# Patient Record
Sex: MALE
[De-identification: names, ages, dates, MRNs, and addresses within clinical notes are randomized per-mention and may not be internally consistent; named-entity substitution may affect disease eponyms.]

---

## 2017-01-20 ENCOUNTER — HOSPITAL (OUTPATIENT)
Dept: OTHER | Age: 67
End: 2017-01-20
Attending: ORTHOPAEDIC SURGERY

## 2017-01-25 ENCOUNTER — HOSPITAL (OUTPATIENT)
Dept: OTHER | Age: 67
End: 2017-01-25
Attending: ORTHOPAEDIC SURGERY

## 2017-01-26 ENCOUNTER — CHARTING TRANS (OUTPATIENT)
Dept: OTHER | Age: 67
End: 2017-01-26

## 2019-12-04 PROBLEM — M17.11 PRIMARY OSTEOARTHRITIS OF RIGHT KNEE: Status: ACTIVE | Noted: 2019-12-04

## 2021-07-09 ENCOUNTER — HOSPITAL ENCOUNTER (EMERGENCY)
Age: 71
Discharge: HOME | End: 2021-07-09
Payer: MEDICARE

## 2021-07-09 VITALS
HEIGHT: 73 IN | WEIGHT: 250 LBS | BODY MASS INDEX: 33.13 KG/M2 | RESPIRATION RATE: 18 BRPM | TEMPERATURE: 98 F | SYSTOLIC BLOOD PRESSURE: 160 MMHG | DIASTOLIC BLOOD PRESSURE: 91 MMHG | HEART RATE: 73 BPM | OXYGEN SATURATION: 99 %

## 2021-07-09 DIAGNOSIS — S81.811A LACERATION OF LOWER EXTREMITY, RIGHT, INITIAL ENCOUNTER: Primary | ICD-10-CM

## 2021-07-09 PROCEDURE — 12002 RPR S/N/AX/GEN/TRNK2.6-7.5CM: CPT

## 2021-07-09 PROCEDURE — 99283 EMERGENCY DEPT VISIT LOW MDM: CPT

## 2021-07-09 PROCEDURE — 90471 IMMUNIZATION ADMIN: CPT

## 2021-07-09 RX ORDER — CEPHALEXIN 500 MG/1
500 CAPSULE ORAL 4 TIMES DAILY
Qty: 40 CAPSULE | Refills: 0 | Status: SHIPPED | OUTPATIENT
Start: 2021-07-09 | End: 2021-07-19

## 2021-07-09 NOTE — ED PROVIDER NOTES
Patient Seen in: [facility] In [facility]      History   Patient presents with:  Laceration/Abrasion    Stated Complaint: Right calf laceration- cut with peice of trim    HPI/Subjective:   HPI    72-year-old male here with complaint of a 113.4 kg   SpO2 99%   BMI 32.98 kg/m²         Physical Exam  Vitals and nursing note reviewed. Constitutional:       Appearance: He is well-developed. HENT:      Head: Normocephalic.       Right Ear: External ear normal.      Left Ear: External ear norm swelling, streaking, drainage or fevers. Take the full course of antibiotics as prescribed in tandem with a probiotic daily. Suture removal in 10 to 14 days.   You may have the sutures removed at your primary care physician's office or one of our immediat

## 2021-07-20 PROBLEM — Z80.42 FAMILY HISTORY OF PROSTATE CANCER: Status: ACTIVE | Noted: 2021-01-28

## 2021-07-20 PROBLEM — R07.9 CHEST PAIN: Status: ACTIVE | Noted: 2018-03-19

## 2021-07-20 PROBLEM — K21.9 GERD (GASTROESOPHAGEAL REFLUX DISEASE): Status: ACTIVE | Noted: 2018-03-19

## 2021-07-20 PROBLEM — E78.5 HYPERLIPIDEMIA: Status: ACTIVE | Noted: 2018-03-19

## 2021-07-20 PROBLEM — K56.609 SMALL BOWEL OBSTRUCTION (HCC): Status: ACTIVE | Noted: 2017-07-31

## 2021-07-20 PROBLEM — I10 HYPERTENSION: Status: ACTIVE | Noted: 2018-03-19

## 2021-07-20 PROBLEM — I44.0 1ST DEGREE AV BLOCK: Status: ACTIVE | Noted: 2018-03-19

## 2021-10-13 ENCOUNTER — APPOINTMENT (OUTPATIENT)
Dept: ULTRASOUND IMAGING | Age: 71
End: 2021-10-13
Attending: EMERGENCY MEDICINE
Payer: MEDICARE

## 2021-10-13 ENCOUNTER — APPOINTMENT (OUTPATIENT)
Dept: GENERAL RADIOLOGY | Age: 71
End: 2021-10-13
Attending: EMERGENCY MEDICINE
Payer: MEDICARE

## 2021-10-13 ENCOUNTER — HOSPITAL ENCOUNTER (EMERGENCY)
Age: 71
Discharge: HOME | End: 2021-10-13
Attending: EMERGENCY MEDICINE
Payer: MEDICARE

## 2021-10-13 VITALS
BODY MASS INDEX: 33.13 KG/M2 | SYSTOLIC BLOOD PRESSURE: 139 MMHG | RESPIRATION RATE: 16 BRPM | TEMPERATURE: 97 F | HEART RATE: 66 BPM | HEIGHT: 73 IN | OXYGEN SATURATION: 99 % | WEIGHT: 250 LBS | DIASTOLIC BLOOD PRESSURE: 85 MMHG

## 2021-10-13 DIAGNOSIS — L03.116 CELLULITIS OF LEFT LOWER EXTREMITY: Primary | ICD-10-CM

## 2021-10-13 PROCEDURE — 85025 COMPLETE CBC W/AUTO DIFF WBC: CPT | Performed by: EMERGENCY MEDICINE

## 2021-10-13 PROCEDURE — 73590 X-RAY EXAM OF LOWER LEG: CPT | Performed by: EMERGENCY MEDICINE

## 2021-10-13 PROCEDURE — 36415 COLL VENOUS BLD VENIPUNCTURE: CPT

## 2021-10-13 PROCEDURE — 93971 EXTREMITY STUDY: CPT | Performed by: EMERGENCY MEDICINE

## 2021-10-13 PROCEDURE — 80053 COMPREHEN METABOLIC PANEL: CPT | Performed by: EMERGENCY MEDICINE

## 2021-10-13 PROCEDURE — 99284 EMERGENCY DEPT VISIT MOD MDM: CPT

## 2021-10-13 RX ORDER — CLINDAMYCIN HYDROCHLORIDE 300 MG/1
300 CAPSULE ORAL 3 TIMES DAILY
Qty: 30 CAPSULE | Refills: 0 | Status: SHIPPED | OUTPATIENT
Start: 2021-10-13 | End: 2021-10-23

## 2021-10-13 NOTE — ED PROVIDER NOTES
Patient Seen in: [facility] In [place]      History   Patient presents with:  Cellulitis    Stated Complaint: left leg redness     Subjective:   HPI    Patient is a 68-year-old male does not take any blood thinners presents emergency ED Triage Vitals [10/13/21 1030]   /89   Pulse 97   Resp 18   Temp 96.7 °F (35.9 °C)   Temp src Temporal   SpO2 98 %   O2 Device None (Room air)       Current:/85   Pulse 66   Temp 96.7 °F (35.9 °C) (Temporal)   Resp 16   Ht 185.4 cm (6' 1\ Labs Reviewed   COMP METABOLIC PANEL (14) - Abnormal; Notable for the following components:       Result Value    Glucose 105 (*)     Sodium 135 (*)     A/G Ratio 0.9 (*)     All other components within normal limits   CBC WITH DIFFERENTIAL WITH PLATEL symptoms worsen or if any other problems arise. Patient was started empirically on antibiotics for what appears to be cellulitis patient discharged home at this time.   Patient will be asked to rest and elevate his leg at home return to the ER immediately

## 2022-01-21 PROBLEM — K56.609 SMALL BOWEL OBSTRUCTION (HCC): Status: RESOLVED | Noted: 2017-07-31 | Resolved: 2022-01-21

## 2022-01-21 PROBLEM — R07.9 CHEST PAIN: Status: RESOLVED | Noted: 2018-03-19 | Resolved: 2022-01-21

## 2023-01-21 ENCOUNTER — OFFICE VISIT (OUTPATIENT)
Dept: FAMILY MEDICINE CLINIC | Facility: CLINIC | Age: 73
End: 2023-01-21
Payer: COMMERCIAL

## 2023-01-21 VITALS — OXYGEN SATURATION: 97 % | TEMPERATURE: 100 F | HEART RATE: 92 BPM

## 2023-01-21 DIAGNOSIS — R68.89 FLU-LIKE SYMPTOMS: Primary | ICD-10-CM

## 2023-01-21 PROCEDURE — 87637 SARSCOV2&INF A&B&RSV AMP PRB: CPT | Performed by: FAMILY MEDICINE

## 2023-01-21 PROCEDURE — 99202 OFFICE O/P NEW SF 15 MIN: CPT | Performed by: FAMILY MEDICINE

## 2023-01-21 RX ORDER — OMEPRAZOLE 40 MG/1
40 CAPSULE, DELAYED RELEASE ORAL AS DIRECTED
COMMUNITY

## 2023-01-21 NOTE — PATIENT INSTRUCTIONS
Your testing will be back in 24-36 hours. Use OTC meds for comfort as needed--  Tylenol for fever/pain  Use Benadryl at bedtime to reduce drainage and promote rest.  Zyrtec/Claritin/Allegra in the AM to reduce nasal drainage without sedation. Use saline nasal sprays to reduce congestion and thin secretions. Use Delsym for cough. Consider applying kush's vapo-rub or eucayptus oil to chest and feet at bedtime to reduce chest and nasal congestion. Warm tea with honey, cough lozenges, vaporizers/steam etc.    To help reduce fever: Add 1 cup of each of the following to bath water that is hot as you can tolerate comfortably. 1 c. Apple cider vinegar  1 c epsom salts  1 c baking soda  1 c sea salt    Soak for 20 minutes to help break fever and reduce body aches. If no better in 2-3 days, follow-up with your PCP for further evaluation.

## 2023-01-22 LAB
FLUAV + FLUBV RNA SPEC NAA+PROBE: NOT DETECTED
FLUAV + FLUBV RNA SPEC NAA+PROBE: NOT DETECTED
RSV RNA SPEC NAA+PROBE: NOT DETECTED
SARS-COV-2 RNA RESP QL NAA+PROBE: DETECTED

## 2023-10-02 ENCOUNTER — HOSPITAL ENCOUNTER (EMERGENCY)
Age: 73
Discharge: LEFT BEFORE DISCHARGE | End: 2023-10-02

## 2023-10-02 ENCOUNTER — APPOINTMENT (OUTPATIENT)
Dept: GENERAL RADIOLOGY | Age: 73
End: 2023-10-02

## 2023-10-02 VITALS
HEART RATE: 92 BPM | SYSTOLIC BLOOD PRESSURE: 103 MMHG | OXYGEN SATURATION: 95 % | BODY MASS INDEX: 33.13 KG/M2 | HEIGHT: 73 IN | RESPIRATION RATE: 16 BRPM | TEMPERATURE: 99 F | WEIGHT: 250 LBS | DIASTOLIC BLOOD PRESSURE: 70 MMHG

## 2023-10-02 PROCEDURE — 73060 X-RAY EXAM OF HUMERUS: CPT

## 2024-08-13 ENCOUNTER — APPOINTMENT (OUTPATIENT)
Dept: GENERAL RADIOLOGY | Age: 74
End: 2024-08-13
Attending: EMERGENCY MEDICINE
Payer: MEDICARE

## 2024-08-13 ENCOUNTER — HOSPITAL ENCOUNTER (EMERGENCY)
Age: 74
Discharge: HOME | End: 2024-08-13
Attending: EMERGENCY MEDICINE
Payer: MEDICARE

## 2024-08-13 ENCOUNTER — APPOINTMENT (OUTPATIENT)
Dept: MRI IMAGING | Age: 74
End: 2024-08-13
Attending: EMERGENCY MEDICINE
Payer: MEDICARE

## 2024-08-13 VITALS
HEART RATE: 69 BPM | RESPIRATION RATE: 16 BRPM | OXYGEN SATURATION: 99 % | WEIGHT: 255 LBS | TEMPERATURE: 98 F | HEIGHT: 73 IN | DIASTOLIC BLOOD PRESSURE: 78 MMHG | BODY MASS INDEX: 33.8 KG/M2 | SYSTOLIC BLOOD PRESSURE: 118 MMHG

## 2024-08-13 DIAGNOSIS — R42 VERTIGO: Primary | ICD-10-CM

## 2024-08-13 LAB
ALBUMIN SERPL-MCNC: 3.8 G/DL (ref 3.4–5)
ALBUMIN/GLOB SERPL: 1.1 {RATIO} (ref 1–2)
ALP LIVER SERPL-CCNC: 105 U/L
ALT SERPL-CCNC: 39 U/L
ANION GAP SERPL CALC-SCNC: 4 MMOL/L (ref 0–18)
AST SERPL-CCNC: 21 U/L (ref 15–37)
ATRIAL RATE: 76 BPM
BASOPHILS # BLD AUTO: 0.07 X10(3) UL (ref 0–0.2)
BASOPHILS NFR BLD AUTO: 1.1 %
BILIRUB SERPL-MCNC: 0.7 MG/DL (ref 0.1–2)
BILIRUB UR QL STRIP.AUTO: NEGATIVE
BUN BLD-MCNC: 23 MG/DL (ref 9–23)
CALCIUM BLD-MCNC: 9.6 MG/DL (ref 8.5–10.1)
CHLORIDE SERPL-SCNC: 105 MMOL/L (ref 98–112)
CLARITY UR REFRACT.AUTO: CLEAR
CO2 SERPL-SCNC: 27 MMOL/L (ref 21–32)
COLOR UR AUTO: YELLOW
CREAT BLD-MCNC: 0.97 MG/DL
EGFRCR SERPLBLD CKD-EPI 2021: 82 ML/MIN/1.73M2 (ref 60–?)
EOSINOPHIL # BLD AUTO: 0.11 X10(3) UL (ref 0–0.7)
EOSINOPHIL NFR BLD AUTO: 1.8 %
ERYTHROCYTE [DISTWIDTH] IN BLOOD BY AUTOMATED COUNT: 12.9 %
GLOBULIN PLAS-MCNC: 3.4 G/DL (ref 2.8–4.4)
GLUCOSE BLD-MCNC: 108 MG/DL (ref 70–99)
GLUCOSE UR STRIP.AUTO-MCNC: NEGATIVE MG/DL
HCT VFR BLD AUTO: 41.1 %
HGB BLD-MCNC: 14 G/DL
IMM GRANULOCYTES # BLD AUTO: 0.03 X10(3) UL (ref 0–1)
IMM GRANULOCYTES NFR BLD: 0.5 %
KETONES UR STRIP.AUTO-MCNC: NEGATIVE MG/DL
LEUKOCYTE ESTERASE UR QL STRIP.AUTO: NEGATIVE
LYMPHOCYTES # BLD AUTO: 0.91 X10(3) UL (ref 1–4)
LYMPHOCYTES NFR BLD AUTO: 14.6 %
MCH RBC QN AUTO: 30.4 PG (ref 26–34)
MCHC RBC AUTO-ENTMCNC: 34.1 G/DL (ref 31–37)
MCV RBC AUTO: 89.2 FL
MONOCYTES # BLD AUTO: 0.44 X10(3) UL (ref 0.1–1)
MONOCYTES NFR BLD AUTO: 7 %
NEUTROPHILS # BLD AUTO: 4.69 X10 (3) UL (ref 1.5–7.7)
NEUTROPHILS # BLD AUTO: 4.69 X10(3) UL (ref 1.5–7.7)
NEUTROPHILS NFR BLD AUTO: 75 %
NITRITE UR QL STRIP.AUTO: NEGATIVE
OSMOLALITY SERPL CALC.SUM OF ELEC: 286 MOSM/KG (ref 275–295)
P AXIS: 4 DEGREES
P-R INTERVAL: 264 MS
PH UR STRIP.AUTO: 5.5 [PH] (ref 5–8)
PLATELET # BLD AUTO: 148 10(3)UL (ref 150–450)
POTASSIUM SERPL-SCNC: 4.1 MMOL/L (ref 3.5–5.1)
PROT SERPL-MCNC: 7.2 G/DL (ref 6.4–8.2)
PROT UR STRIP.AUTO-MCNC: NEGATIVE MG/DL
Q-T INTERVAL: 394 MS
QRS DURATION: 102 MS
QTC CALCULATION (BEZET): 443 MS
R AXIS: 19 DEGREES
RBC # BLD AUTO: 4.61 X10(6)UL
RBC UR QL AUTO: NEGATIVE
SODIUM SERPL-SCNC: 136 MMOL/L (ref 136–145)
SP GR UR STRIP.AUTO: 1.01 (ref 1–1.03)
T AXIS: 4 DEGREES
UROBILINOGEN UR STRIP.AUTO-MCNC: 0.2 MG/DL
VENTRICULAR RATE: 76 BPM
WBC # BLD AUTO: 6.3 X10(3) UL (ref 4–11)

## 2024-08-13 PROCEDURE — A9575 INJ GADOTERATE MEGLUMI 0.1ML: HCPCS | Performed by: EMERGENCY MEDICINE

## 2024-08-13 PROCEDURE — 70553 MRI BRAIN STEM W/O & W/DYE: CPT | Performed by: EMERGENCY MEDICINE

## 2024-08-13 PROCEDURE — 81003 URINALYSIS AUTO W/O SCOPE: CPT | Performed by: EMERGENCY MEDICINE

## 2024-08-13 PROCEDURE — 96360 HYDRATION IV INFUSION INIT: CPT

## 2024-08-13 PROCEDURE — 80053 COMPREHEN METABOLIC PANEL: CPT | Performed by: EMERGENCY MEDICINE

## 2024-08-13 PROCEDURE — 93005 ELECTROCARDIOGRAM TRACING: CPT

## 2024-08-13 PROCEDURE — 93010 ELECTROCARDIOGRAM REPORT: CPT

## 2024-08-13 PROCEDURE — 99284 EMERGENCY DEPT VISIT MOD MDM: CPT

## 2024-08-13 PROCEDURE — 85025 COMPLETE CBC W/AUTO DIFF WBC: CPT

## 2024-08-13 PROCEDURE — 99285 EMERGENCY DEPT VISIT HI MDM: CPT

## 2024-08-13 PROCEDURE — 80053 COMPREHEN METABOLIC PANEL: CPT

## 2024-08-13 PROCEDURE — 85025 COMPLETE CBC W/AUTO DIFF WBC: CPT | Performed by: EMERGENCY MEDICINE

## 2024-08-13 PROCEDURE — 70030 X-RAY EYE FOR FOREIGN BODY: CPT | Performed by: EMERGENCY MEDICINE

## 2024-08-13 RX ORDER — ASPIRIN 81 MG/1
81 TABLET, CHEWABLE ORAL DAILY
COMMUNITY

## 2024-08-13 RX ORDER — MECLIZINE HYDROCHLORIDE 25 MG/1
25 TABLET ORAL ONCE
Status: COMPLETED | OUTPATIENT
Start: 2024-08-13 | End: 2024-08-13

## 2024-08-13 RX ORDER — MECLIZINE HYDROCHLORIDE 25 MG/1
25 TABLET ORAL 3 TIMES DAILY PRN
Qty: 20 TABLET | Refills: 0 | Status: SHIPPED | OUTPATIENT
Start: 2024-08-13

## 2024-08-13 RX ORDER — GADOTERATE MEGLUMINE 376.9 MG/ML
30 INJECTION INTRAVENOUS
Status: COMPLETED | OUTPATIENT
Start: 2024-08-13 | End: 2024-08-13

## 2024-08-13 NOTE — ED INITIAL ASSESSMENT (HPI)
Pt reports since last week he started to have intermittent dizziness, suspected high blood pressure (per pt never actually checked his BP) and visual changes. Pt reports he was golfing today and could not finish due to these symptoms.

## 2024-08-13 NOTE — ED PROVIDER NOTES
Patient Seen in: [facility] In [place]      History     Chief Complaint   Patient presents with    Hypertension    Dizziness     Stated Complaint: high blood pressure, visual changes off and on for past week    Subjective:   HPI    Mr. Perez reported experiencing episodes of visual changes and dizziness. These episodes began last week, with the patient noting that he would feel dizzy upon standing up from a seated position. He mentioned that he had experienced similar episodes in the past, but not with the same frequency or intensity. The patient is currently undergoing treatment for prostate cancer and had initially thought that these symptoms might be related to his treatment. However, after consulting with his cancer nurse, he was informed that these symptoms were not related to his cancer treatment. He did mention that he experiences tiredness and sweating, which they attribute to his prostate cancer treatment. On the day of the consultation, the patient reported feeling unwell while golfing, a regular activity he engages in every Tuesday. He described feeling off-balance and having difficulty following the movement of the golf ball due to visual disturbances. He did not fall or lose consciousness but had to regain his balance several times. He also reported that his vision would blur and then return to normal. The patient denied experiencing any headaches but mentioned a long-standing issue with his neck in terms of pain. However, he denied any shooting pain down his arms or numbness on either side of his body. He recently had a retina eye exam, which did not reveal any issues apart from floaters.    Objective:   Past Medical History:    Essential hypertension    GERD (gastroesophageal reflux disease)    High blood pressure    High cholesterol    Hyperlipidemia    Osteoarthritis              Past Surgical History:   Procedure Laterality Date    Foot surgery Left 2000    ankle/heal ORIF     Knee arthroscopy Right 2009 and 1991    x2    Other surgical history  2016    kidney stones                Social History     Socioeconomic History    Marital status:     Number of children: 0   Occupational History    Occupation: Retired--   Tobacco Use    Smoking status: Some Days     Types: Cigars    Smokeless tobacco: Never   Vaping Use    Vaping status: Never Used   Substance and Sexual Activity    Alcohol use: Yes     Comment: social beer    Drug use: Never    Sexual activity: Yes     Partners: Female   Other Topics Concern     Service Yes     Comment: [employer] x 2 years    Blood Transfusions No    Sleep Concern No    Seat Belt Yes   Social History Narrative         Two adopted children--adults     Social Determinants of Health      Received from [facility]              Review of Systems    Positive for stated Chief Complaint: Hypertension and Dizziness    Other systems are as noted in HPI.  Constitutional and vital signs reviewed.      All other systems reviewed and negative except as noted above.    Physical Exam     ED Triage Vitals [08/13/24 1116]   /80   Pulse 85   Resp 16   Temp 98.3 °F (36.8 °C)   Temp src    SpO2 98 %   O2 Device None (Room air)       Current Vitals:   Vital Signs  BP: 124/90  Pulse: 75  Resp: 16  Temp: 98.3 °F (36.8 °C)    Oxygen Therapy  SpO2: 97 %  O2 Device: None (Room air)            Physical Exam    General: Alert and oriented x3 in no acute distress.  HEENT: Normocephalic, atraumatic, pupils equal round and reactive to light.  Neck: Supple.  Cardiovascular: Regular rate and rhythm, no murmurs.  Respiratory: Lungs clear to auscultation.  Extremities: No CCE.  Skin: Warm and dry.  Neurologic: Cranial nerves intact.  Strength 5/5 in all extremities.  Sensory exam grossly intact.    ED Course     Labs Reviewed   CBC WITH DIFFERENTIAL WITH PLATELET - Abnormal; Notable for the following components:       Result Value    PLT  148.0 (*)     Lymphocyte Absolute 0.91 (*)     All other components within normal limits   COMP METABOLIC PANEL (14) - Abnormal; Notable for the following components:    Glucose 108 (*)     All other components within normal limits   URINALYSIS, ROUTINE   RAINBOW DRAW LAVENDER   RAINBOW DRAW LIGHT GREEN   RAINBOW DRAW BLUE     EKG    Rate, intervals and axes as noted on EKG Report.  Rate: 76  Rhythm: Sinus rhythm with first-degree AV block  Reading: No acute ischemic abnormality                 MRI brain: Pending    Medications   sodium chloride 0.9 % IV bolus 1,000 mL (0 mL Intravenous Stopped 8/13/24 1228)   meclizine (Antivert) tab 25 mg (25 mg Oral Given 8/13/24 1248)     Patient was given meclizine for dizziness and started on a normal saline bolus.         MDM      Patient presents with dizziness.  Differential diagnosis includes but is not limited to benign positional vertigo and acute stroke.  The patient had been concerned that his blood pressure may be elevated but it was not on arrival.  His symptoms seem consistent with vertigo.  His neurologic exam is intact.  He has overall reassuring lab work.  He is going for an MRI to rule out a stroke.  If his MRI is negative I think he can safely be discharged home on meclizine with follow-up.                           MDM    Disposition and Plan     Clinical Impression:  1. Vertigo         Disposition:  There is no disposition on file for this visit.  There is no disposition time on file for this visit.    Follow-up:  Percy Mendez MD  [address] [facility] [place]  [address] [place] [id number] [phone]    Follow up            Medications Prescribed:  Current Discharge Medication List        START taking these medications    Details   meclizine 25 MG Oral Tab Take 1 tablet (25 mg total) by mouth 3 (three) times daily as needed.  Qty: 20 tablet, Refills: 0

## 2025-01-26 ENCOUNTER — OFFICE VISIT (OUTPATIENT)
Dept: FAMILY MEDICINE CLINIC | Facility: CLINIC | Age: 75
End: 2025-01-26
Payer: COMMERCIAL

## 2025-01-26 VITALS
SYSTOLIC BLOOD PRESSURE: 114 MMHG | TEMPERATURE: 98 F | HEART RATE: 82 BPM | BODY MASS INDEX: 35 KG/M2 | DIASTOLIC BLOOD PRESSURE: 70 MMHG | RESPIRATION RATE: 16 BRPM | OXYGEN SATURATION: 98 % | WEIGHT: 268.38 LBS

## 2025-01-26 DIAGNOSIS — J06.9 VIRAL URI WITH COUGH: Primary | ICD-10-CM

## 2025-01-26 LAB
OPERATOR ID: NORMAL
RAPID SARS-COV-2 BY PCR: NOT DETECTED

## 2025-01-26 NOTE — PROGRESS NOTES
CHIEF COMPLAINT:     Chief Complaint   Patient presents with    Sinus Problem     Chest congestion, sore throat due to sinus.  On hormone therapy for prostate cancer which I sweat - Entered by patient  S/s for 4 days.  OTC meds taken       HPI:   Negrito Perez is a 74 year old male who presents for sinus congestion, sinus drainage, sore throat, cough, chest congestion. Symptoms started 3 days ago.   Today with increased fatigue and chest congestion. Treating symptoms with sudafed and delsym, last dose yesterday.  No fever or chills- states has had some sweats but states that is typical for him due to his therapy for prostate cancer.  Patient has not tested for COVID since symptom onset.    Current Outpatient Medications   Medication Sig Dispense Refill    aspirin 81 MG Oral Chew Tab Chew 1 tablet (81 mg total) by mouth daily.      Cranberry 250 MG Oral Chew Tab Chew by mouth.      meclizine 25 MG Oral Tab Take 1 tablet (25 mg total) by mouth 3 (three) times daily as needed. 20 tablet 0    Cholecalciferol 50 MCG (2000 UT) Oral Cap Take by mouth As Directed. (Patient not taking: Reported on 8/13/2024)      Omeprazole 40 MG Oral Capsule Delayed Release Take 1 capsule (40 mg total) by mouth As Directed.      losartan 50 MG Oral Tab Take 1 tablet (50 mg total) by mouth daily. 90 tablet 3    triamcinolone 0.1 % External Cream Apply twice a day to itch; do not use on face (Patient not taking: Reported on 8/13/2024) 80 g 11    mupirocin 2 % External Ointment mupirocin 2 % topical ointment  APPLY SMALL AMOUNT EXTERNALLY TO THE AFFECTED AREA THREE TIMES DAILY (Patient not taking: Reported on 8/13/2024) 1 each 0    ATORVASTATIN 20 MG Oral Tab TAKE 1 TABLET(20 MG) BY MOUTH DAILY 90 tablet 0    Meloxicam 15 MG Oral Tab Take 1 tablet (15 mg total) by mouth daily. 90 tablet 3      Past Medical History:    Essential hypertension    GERD (gastroesophageal reflux disease)    High blood pressure    High cholesterol    Hyperlipidemia     Osteoarthritis      Past Surgical History:   Procedure Laterality Date    Foot surgery Left 2000    ankle/heal ORIF    Knee arthroscopy Right 2009 and 1991    x2    Other surgical history  2016    kidney stones         Social History     Socioeconomic History    Marital status:     Number of children: 0   Occupational History    Occupation: Retired--   Tobacco Use    Smoking status: Some Days     Types: Cigars    Smokeless tobacco: Never   Vaping Use    Vaping status: Never Used   Substance and Sexual Activity    Alcohol use: Yes     Comment: social beer    Drug use: Never    Sexual activity: Yes     Partners: Female   Other Topics Concern     Service Yes     Comment: [employer] x 2 years    Blood Transfusions No    Sleep Concern No    Seat Belt Yes   Social History Narrative         Two adopted children--adults     Social Drivers of Health      Received from [facility]         REVIEW OF SYSTEMS:   GENERAL: no fever. + fatigue.   SKIN: no rashes or abnormal skin lesions  HEENT: See HPI  LUNGS: denies shortness of breath or wheezing, See HPI  CARDIOVASCULAR: denies chest pain or palpitations   GI: denies N/V/C or abdominal pain  NEURO: Denies dizziness or weakness    EXAM:   /70   Pulse 82   Temp 98.3 °F (36.8 °C) (Oral)   Resp 16   Wt 268 lb 6.4 oz (121.7 kg)   SpO2 98%   BMI 35.41 kg/m²   GENERAL: well developed, well nourished,in no apparent distress  SKIN: no rashes,no suspicious lesions  HEAD: atraumatic, normocephalic.    EYES: conjunctiva clear, EOM intact  EARS: TM's gray, no bulging, no retraction,no fluid, bony landmarks visible  NOSE: Nostrils patent, clear nasal discharge, nasal mucosa erythematous   THROAT: Oral mucosa pink, moist. Posterior pharynx is non erythematous. no exudates. Tonsils 1/4.    NECK: Supple, non-tender  LUNGS: clear to auscultation bilaterally, no wheezes or rhonchi. Breathing is non labored.  CARDIO: RRR without  murmur  EXTREMITIES: no cyanosis, clubbing or edema  LYMPH:  no lymphadenopathy.        ASSESSMENT AND PLAN:   Negrito Perez is a 74 year old male who presents with upper respiratory symptoms that are consistent with    ASSESSMENT:   Encounter Diagnosis   Name Primary?    Viral URI with cough Yes         PLAN:   Rapid COVID negative  Lungs clear on exam however due to reported increased chest congestion and worsening symptoms today, offered cxr to r/o developing pneumonia. Pt declines.   Comfort care per pt instructions.   To follow up for any new or worsening symptoms or if not improving the next few days.   To go to the ER for any severe symptoms such as difficulty breathing or chest pain.     Meds & Refills for this Visit:  Requested Prescriptions      No prescriptions requested or ordered in this encounter       Risks, benefits, and side effects of medication explained and discussed.    Patient Instructions   Push fluids and rest  Expectorant for chest congestion  Humidifier  Follow up with your primary care doctor if not improving over the next 3-4 days  Seek urgent follow up for any new/ worsening symptoms    The patient indicates understanding of these issues and agrees to the plan.  The patient is asked to return if sx's persist or worsen.

## 2025-01-26 NOTE — PATIENT INSTRUCTIONS
Push fluids and rest  Expectorant for chest congestion  Humidifier  Follow up with your primary care doctor if not improving over the next 3-4 days  Seek urgent follow up for any new/ worsening symptoms

## 2025-03-14 ENCOUNTER — OFFICE VISIT (OUTPATIENT)
Dept: FAMILY MEDICINE CLINIC | Facility: CLINIC | Age: 75
End: 2025-03-14
Payer: COMMERCIAL

## 2025-03-14 VITALS
BODY MASS INDEX: 36 KG/M2 | RESPIRATION RATE: 16 BRPM | WEIGHT: 274 LBS | HEART RATE: 74 BPM | SYSTOLIC BLOOD PRESSURE: 134 MMHG | OXYGEN SATURATION: 95 % | TEMPERATURE: 99 F | DIASTOLIC BLOOD PRESSURE: 80 MMHG

## 2025-03-14 DIAGNOSIS — J11.1 INFLUENZA-LIKE ILLNESS: Primary | ICD-10-CM

## 2025-03-14 PROCEDURE — 87637 SARSCOV2&INF A&B&RSV AMP PRB: CPT | Performed by: NURSE PRACTITIONER

## 2025-03-14 PROCEDURE — 99213 OFFICE O/P EST LOW 20 MIN: CPT | Performed by: NURSE PRACTITIONER

## 2025-03-14 NOTE — PROGRESS NOTES
Chief Complaint   Patient presents with    Flu     Entered by patient  Chills, body aches, more tired since bedtime.  OTC meds taken.     :    HPI:   Negrito Perez is a 75 year old male who presents for chills, body aches, and fatigue. Started suddenly last night.  Denies fever, new congestion/cough, no sore throat. Tolerating po.  Denies rash, N/V/D.     Treatment tried: OTC      Current Outpatient Medications   Medication Sig Dispense Refill    aspirin 81 MG Oral Chew Tab Chew 1 tablet (81 mg total) by mouth daily.      Cranberry 250 MG Oral Chew Tab Chew by mouth.      meclizine 25 MG Oral Tab Take 1 tablet (25 mg total) by mouth 3 (three) times daily as needed. 20 tablet 0    Cholecalciferol 50 MCG (2000 UT) Oral Cap Take by mouth As Directed. (Patient not taking: Reported on 8/13/2024)      Omeprazole 40 MG Oral Capsule Delayed Release Take 1 capsule (40 mg total) by mouth As Directed.      losartan 50 MG Oral Tab Take 1 tablet (50 mg total) by mouth daily. 90 tablet 3    triamcinolone 0.1 % External Cream Apply twice a day to itch; do not use on face (Patient not taking: Reported on 8/13/2024) 80 g 11    mupirocin 2 % External Ointment mupirocin 2 % topical ointment  APPLY SMALL AMOUNT EXTERNALLY TO THE AFFECTED AREA THREE TIMES DAILY (Patient not taking: Reported on 8/13/2024) 1 each 0    ATORVASTATIN 20 MG Oral Tab TAKE 1 TABLET(20 MG) BY MOUTH DAILY 90 tablet 0    Meloxicam 15 MG Oral Tab Take 1 tablet (15 mg total) by mouth daily. 90 tablet 3      Past Medical History:    Essential hypertension    GERD (gastroesophageal reflux disease)    High blood pressure    High cholesterol    Hyperlipidemia    Osteoarthritis      Past Surgical History:   Procedure Laterality Date    Foot surgery Left 2000    ankle/heal ORIF    Knee arthroscopy Right 2009 and 1991    x2    Other surgical history  2016    kidney stones      Family History   Problem Relation Age of Onset    Heart Disease Father         heart attack     Prostate Cancer Brother     Prostate Cancer Brother       Social History     Socioeconomic History    Marital status:     Number of children: 0   Occupational History    Occupation: Retired--   Tobacco Use    Smoking status: Some Days     Types: Cigars    Smokeless tobacco: Never   Vaping Use    Vaping status: Never Used   Substance and Sexual Activity    Alcohol use: Yes     Comment: social beer    Drug use: Never    Sexual activity: Yes     Partners: Female   Other Topics Concern     Service Yes     Comment: [employer] x 2 years    Blood Transfusions No    Sleep Concern No    Seat Belt Yes   Social History Narrative         Two adopted children--adults     Social Drivers of Health      Received from [facility]         REVIEW OF SYSTEMS:   GENERAL: see HPI  SKIN: no rashes  EYES:denies blurred vision or double vision  HEENT: congested; see HPI  CHEST: no chest pains, palpitations.  LUNGS: denies shortness of breath or wheezing.  CARDIOVASCULAR: denies chest pain.  GI: no abdominal pain; see HPI  URO: no decreased urination.      EXAM:   /80   Pulse 74   Temp 98.8 °F (37.1 °C) (Oral)   Resp 16   Wt 274 lb (124.3 kg)   SpO2 95%   BMI 36.15 kg/m²   GENERAL: well developed, well nourished, in no apparent distress, acutely sick.  SKIN: no rashes,no suspicious lesions, flushed.  Warm to touch.  HEAD: atraumatic, normocephalic,  no tenderness on palpation of sinuses  EYES: conjunctiva clear  EARS: TM's clear gray, no bulging, no retraction, no fluid, bony landmarks visible  NOSE: nostrils patent, clear nasal mucous, nasal mucosa reddened and swollen  THROAT: oral mucosa pink, moist. Posterior pharynx is not erythematous. no exudates.  NECK: supple, non-tender  LUNGS: clear to auscultation bilaterally, no wheezes or rhonchi. Breathing is non labored.  Dry cough.  CARDIO: RRR without murmur  GI: good BS's,no masses, HSM or tenderness  EXTREMITIES: no cyanosis,  clubbing or edema  LYMPH:  no cervical lymphadenopathy.        ASSESSMENT AND PLAN:   Negrito Perez is a 75 year old male who presents with flu-like symptoms    ASSESSMENT:  Encounter Diagnosis   Name Primary?    Influenza-like illness Yes       PLAN:  Quad panel sent.  Natural course of influenza, possible complications, CDC recommendations, and treatment options discussed.  Patient has elected to start an antiviral if +  after discussion of risks and benefits. Explained may lessen severity and duration of symptoms, but will not completely remove symptoms.    Rest, increase fluids,ibuprofen/tylenol q 6 hours for fever/aches prn.   Discussed OTC options for symptom relief.    Complications of influenza discussed including secondary infections such as AOM, bronchitis, PNA, sinusitis.  To be rechecked if exhibiting any symptoms of these illnesses.   To f/u with PCP in 3-4 days if sx's persist. Seek immediate medical attention for acute or worsening symptoms.   Verbalizes understanding of these issues and agrees to the plan.    Meds & Refills for this Visit:  Requested Prescriptions      No prescriptions requested or ordered in this encounter         Patient Instructions   I recommend the 4 H's for inflammation:    1. Heat (warm mist from the shower or warm liquids such as tea)  2. Honey (mixed in your tea or by the spoonful [if you are not diabetic; over the age of 1 year]--take a spoonful 3 times a day and don't eat or drink anything for 15-20 minutes)  3. Humidity--cool mist in the bedroom at night  4. Hydration --at least 8 -10 glasses a day

## 2025-03-15 LAB
FLUAV + FLUBV RNA SPEC NAA+PROBE: NOT DETECTED
FLUAV + FLUBV RNA SPEC NAA+PROBE: NOT DETECTED
RSV RNA SPEC NAA+PROBE: NOT DETECTED
SARS-COV-2 RNA RESP QL NAA+PROBE: NOT DETECTED

## 2025-06-03 ENCOUNTER — HOSPITAL ENCOUNTER (EMERGENCY)
Age: 75
Discharge: HOME | End: 2025-06-03
Attending: EMERGENCY MEDICINE
Payer: MEDICARE

## 2025-06-03 VITALS
SYSTOLIC BLOOD PRESSURE: 127 MMHG | BODY MASS INDEX: 35.89 KG/M2 | HEIGHT: 72 IN | TEMPERATURE: 98 F | RESPIRATION RATE: 18 BRPM | WEIGHT: 265 LBS | HEART RATE: 61 BPM | OXYGEN SATURATION: 97 % | DIASTOLIC BLOOD PRESSURE: 82 MMHG

## 2025-06-03 DIAGNOSIS — M54.16 LUMBAR RADICULOPATHY, ACUTE: Primary | ICD-10-CM

## 2025-06-03 PROCEDURE — 99283 EMERGENCY DEPT VISIT LOW MDM: CPT

## 2025-06-03 PROCEDURE — 96372 THER/PROPH/DIAG INJ SC/IM: CPT

## 2025-06-03 PROCEDURE — 99284 EMERGENCY DEPT VISIT MOD MDM: CPT

## 2025-06-03 RX ORDER — ORPHENADRINE CITRATE 100 MG/1
100 TABLET ORAL 2 TIMES DAILY
Qty: 20 EACH | Refills: 0 | Status: SHIPPED | OUTPATIENT
Start: 2025-06-03 | End: 2025-06-13

## 2025-06-03 RX ORDER — CYCLOBENZAPRINE HCL 10 MG
10 TABLET ORAL ONCE
Status: COMPLETED | OUTPATIENT
Start: 2025-06-03 | End: 2025-06-03

## 2025-06-03 RX ORDER — METHYLPREDNISOLONE 4 MG/1
TABLET ORAL
Qty: 21 TABLET | Refills: 0 | Status: SHIPPED | OUTPATIENT
Start: 2025-06-03

## 2025-06-03 RX ORDER — KETOROLAC TROMETHAMINE 30 MG/ML
60 INJECTION, SOLUTION INTRAMUSCULAR; INTRAVENOUS ONCE
Status: COMPLETED | OUTPATIENT
Start: 2025-06-03 | End: 2025-06-03

## 2025-06-03 NOTE — ED PROVIDER NOTES
Patient Seen in: [facility] In [place]        History  Chief Complaint   Patient presents with    Back Pain     Stated Complaint: lower back pain, left leg radiation while golfing    Subjective:   74 yo male presents to the emergency department with c/o low back pain.  Patient states over the last few months he has been having some low back pain.  He takes meloxicam and Tylenol as needed for this.  Then on Saturday while golfing he went to swing his club, and felt a sharp shooting pain to his left lower back and leg.  The pain took him down to his knees.  He states that since then he has been taking the meloxicam and Tylenol for the pain without any improvement.  He has not taken his meloxicam in 2 days.  He denies any numbness, tingling, saddle paresthesias, loss of bowel or bladder control.      The history is provided by the patient.                   Objective:     Past Medical History:    Cancer (HCC)    Essential hypertension    GERD (gastroesophageal reflux disease)    High blood pressure    High cholesterol    Hyperlipidemia    Osteoarthritis              Past Surgical History:   Procedure Laterality Date    Foot surgery Left 2000    ankle/heal ORIF    Knee arthroscopy Right 2009 and 1991    x2    Other surgical history  2016    kidney stones                Social History     Socioeconomic History    Marital status:     Number of children: 0   Occupational History    Occupation: Retired--   Tobacco Use    Smoking status: Some Days     Types: Cigars    Smokeless tobacco: Never   Vaping Use    Vaping status: Never Used   Substance and Sexual Activity    Alcohol use: Yes     Comment: social beer    Drug use: Never    Sexual activity: Yes     Partners: Female   Other Topics Concern     Service Yes     Comment:  [employer] x 2 years    Blood Transfusions No    Sleep Concern No    Seat Belt Yes   Social History Narrative         Two adopted children--adults      Social Drivers of Health      Received from [employer]    [facility]                                Physical Exam    ED Triage Vitals   BP 06/03/25 1047 136/84   Pulse 06/03/25 1048 67   Resp 06/03/25 1048 18   Temp 06/03/25 1048 97.7 °F (36.5 °C)   Temp src 06/03/25 1048 Oral   SpO2 06/03/25 1048 98 %   O2 Device --        Current Vitals:   Vital Signs  BP: 127/82  Pulse: 61  Resp: 18  Temp: 97.7 °F (36.5 °C)  Temp src: Oral    Oxygen Therapy  SpO2: 97 %            Physical Exam  Vitals and nursing note reviewed.   Constitutional:       General: He is not in acute distress.     Appearance: Normal appearance. He is normal weight. He is not ill-appearing.   HENT:      Head: Normocephalic and atraumatic.      Nose: Nose normal.      Mouth/Throat:      Mouth: Mucous membranes are moist.      Pharynx: Oropharynx is clear.   Eyes:      Conjunctiva/sclera: Conjunctivae normal.   Cardiovascular:      Rate and Rhythm: Normal rate and regular rhythm.      Pulses: Normal pulses.      Heart sounds: Normal heart sounds.   Pulmonary:      Effort: Pulmonary effort is normal. No respiratory distress.      Breath sounds: Normal breath sounds.   Musculoskeletal:         General: Tenderness and signs of injury present. No swelling or deformity. Normal range of motion.      Comments: Tenderness with palpation of the left lower lumbar paraspinal muscles and into the buttock.  No vertebral point tenderness, crepitus, or step-offs.  Moderate spasm present.  ROM, motor strength, and sensation intact.  Dorsiflexion plantarflexion are 5 out of 5 and equal bilaterally.  Cap refill less than 2 seconds and DP is 2+.   Skin:     General: Skin is warm and dry.      Capillary Refill: Capillary refill takes less than 2 seconds.   Neurological:      General: No focal deficit present.      Mental Status: He is alert and oriented to person, place, and time.   Psychiatric:         Mood and Affect: Mood normal.         Behavior: Behavior normal.                ED Course  Labs Reviewed - No data to display                [facility]       Medical Decision Making  75-year-old male with history and exam consistent with a lumbar strain with radiculopathy.  Flexeril and Toradol given here in the department.    Do not feel that any imaging is necessary at this time.  Will plan to treat patient medically.  No evidence of fracture or acute cord compression.  Patient can take Ibuprofen and/or Tylenol for pain.  A muscle relaxer was prescribed for stiffness and soreness.  Supportive management at home.  Follow up with PCP in 1 week as needed.    Patient had good relief with Toradol and Flexeril.  Will be discharged home with his spouse.    Risk  OTC drugs.  Prescription drug management.        Disposition and Plan     Clinical Impression:  1. Lumbar radiculopathy, acute         Disposition:  Discharge  6/3/2025 12:54 pm    Follow-up:  Percy Mendez MD  [address]  [address] [place] [id number] [phone]    Follow up in 1 week(s)            Medications Prescribed:  Discharge Medication List as of 6/3/2025 12:56 PM        START taking these medications    Details   orphenadrine  MG Oral Tablet 12 Hr Take 100 mg by mouth 2 (two) times daily for 10 days., Normal, Disp-20 each, R-0      methylPREDNISolone 4 MG Oral Tablet Therapy Pack Take as directed on dose pack instructions, Normal, Disp-21 tablet, R-0                   Supplementary Documentation:

## 2025-06-03 NOTE — ED INITIAL ASSESSMENT (HPI)
Pt was golfing at felt something after swinging radiates from left hip to left foot. Fell to the ground.

## 2025-06-03 NOTE — DISCHARGE INSTRUCTIONS
Rest and drink plenty of fluids.   Apply ice 20 minutes on and then 40 minutes off several times a day.  Alternate ice with warm, moist compresses.   Take your meloxicam daily as prescribed.  Use Tylenol every 6 hours as needed.  Use the muscle relaxer for muscle stiffness or soreness.   Start the Medrol Dosepak and make sure to take it as prescribed on the instructions.  Take it with food as it can upset your stomach.  After the acute pain improves, start with light stretching or yoga to help prevent further injury.   Follow up with your PCP in 1 week.      Thank you for choosing [facility] for your care.